# Patient Record
Sex: FEMALE | HISPANIC OR LATINO
[De-identification: names, ages, dates, MRNs, and addresses within clinical notes are randomized per-mention and may not be internally consistent; named-entity substitution may affect disease eponyms.]

---

## 2018-08-19 ENCOUNTER — RESULT REVIEW (OUTPATIENT)
Age: 48
End: 2018-08-19

## 2022-04-28 ENCOUNTER — APPOINTMENT (OUTPATIENT)
Dept: ORTHOPEDIC SURGERY | Facility: CLINIC | Age: 52
End: 2022-04-28
Payer: COMMERCIAL

## 2022-04-28 VITALS — BODY MASS INDEX: 18.48 KG/M2 | WEIGHT: 115 LBS | HEIGHT: 66 IN

## 2022-04-28 DIAGNOSIS — Z78.9 OTHER SPECIFIED HEALTH STATUS: ICD-10-CM

## 2022-04-28 DIAGNOSIS — M54.2 CERVICALGIA: ICD-10-CM

## 2022-04-28 PROBLEM — Z00.00 ENCOUNTER FOR PREVENTIVE HEALTH EXAMINATION: Status: ACTIVE | Noted: 2022-04-28

## 2022-04-28 PROCEDURE — 99204 OFFICE O/P NEW MOD 45 MIN: CPT

## 2022-04-28 PROCEDURE — 72050 X-RAY EXAM NECK SPINE 4/5VWS: CPT

## 2022-04-28 RX ORDER — NAPROXEN 500 MG/1
500 TABLET ORAL
Qty: 60 | Refills: 0 | Status: ACTIVE | COMMUNITY
Start: 2022-04-28 | End: 1900-01-01

## 2022-04-28 RX ORDER — CYCLOBENZAPRINE HYDROCHLORIDE 5 MG/1
5 TABLET, FILM COATED ORAL
Qty: 90 | Refills: 0 | Status: ACTIVE | COMMUNITY
Start: 2022-04-28 | End: 1900-01-01

## 2022-04-28 NOTE — ASSESSMENT
[FreeTextEntry1] : Initiate splints QHS \par PT, meds for neck\par Will discuss imaging\par \par Muscle Relaxants- To help decrease muscle spasm and assist with pain relief. Advised of sedating effects and instructed not to drive, operate heavy machinery, or take with other sedating medications.\par NSAIDs- Patient warned of risk of medication to GI tract, increased blood pressure, cardiac risk, and risk of fluid retention.  Advised to clear medication with internist or PCP if any concurrent health problem with heart, blood pressure, or GI system exists.
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
## 2022-04-28 NOTE — IMAGING
[de-identified] : Inspection: No rash or ecchymosis\par Palpation: No spasm in traps, rhomboids, paracervicals. TTP R paraspinal and trap\par ROM: Full with stiffness\par Strength: 5/5 bilateral deltoid, biceps, triceps, wrist flexors, wrist extensors, , abductors\par Sensation: Sensation present to light touch bilateral C5-T1 distributions\par Reflexes: Positive Conroy's left\par \par + b/l carpal compression testing R>L\par \par Bilateral Shoulders-\par Palpation: No tenderness to palpation\par ROM: Full with no pain\par Strength: Decent strength with rotator cuff testing\par Provocative maneuvers: Negative impingement testing   [Straightening consistent with spasm] : Straightening consistent with spasm [Facet arthropathy] : Facet arthropathy [Disc space narrowing] : Disc space narrowing [FreeTextEntry1] : DDD C4-7

## 2022-06-09 ENCOUNTER — APPOINTMENT (OUTPATIENT)
Dept: ORTHOPEDIC SURGERY | Facility: CLINIC | Age: 52
End: 2022-06-09
Payer: COMMERCIAL

## 2022-06-09 PROCEDURE — 99214 OFFICE O/P EST MOD 30 MIN: CPT

## 2022-06-09 NOTE — HISTORY OF PRESENT ILLNESS
[Neck] : neck [Gradual] : gradual [4] : 4 [2] : 2 [Dull/Aching] : dull/aching [Rest] : rest [Full time] : Work status: full time [de-identified] : 04/28/2022: 51RHDF. Several years of atraumatic neck pain localized to R parspinal and trap. No pain, N/T down the BUE. Does endorse b/l hand numbness that awakens her. No b/b dysfunction. \par \par 6/9/22: Neck pain improving with PT/HEP. Reports occasional numbness in fingers of b/l hands at night (has been wearing splints for CTS but they broke).  [] : no [de-identified] : movement

## 2022-06-09 NOTE — IMAGING
[de-identified] : CSPINE\par Inspection: No rash or ecchymosis\par Palpation: No spasm in traps, rhomboids, paracervicals. TTP R paraspinal and rhomboid\par ROM: Full with stiffness\par Strength: 5/5 bilateral deltoid, biceps, triceps, wrist flexors, wrist extensors, , abductors\par Sensation: Sensation present to light touch bilateral C5-T1 distributions\par Reflexes: Positive Conroy's left\par \par + b/l carpal compression testing R>L\par \par Bilateral Shoulders-\par Palpation: No tenderness to palpation\par ROM: Full with no pain\par Strength: Decent strength with rotator cuff testing\par Provocative maneuvers: Negative impingement testing

## 2022-06-09 NOTE — ASSESSMENT
[FreeTextEntry1] : c/w splints QHS \par PT, meds for neck\par Will discuss imaging\par \par Muscle Relaxants- To help decrease muscle spasm and assist with pain relief. Advised of sedating effects and instructed not to drive, operate heavy machinery, or take with other sedating medications.\par \par NSAIDs- Patient warned of risk of medication to GI tract, increased blood pressure, cardiac risk, and risk of fluid retention.  Advised to clear medication with internist or PCP if any concurrent health problem with heart, blood pressure, or GI system exists.

## 2022-07-28 ENCOUNTER — APPOINTMENT (OUTPATIENT)
Dept: ORTHOPEDIC SURGERY | Facility: CLINIC | Age: 52
End: 2022-07-28

## 2022-12-08 ENCOUNTER — APPOINTMENT (OUTPATIENT)
Dept: ORTHOPEDIC SURGERY | Facility: CLINIC | Age: 52
End: 2022-12-08

## 2022-12-08 DIAGNOSIS — G56.00 CARPAL TUNNEL SYNDROME, UNSPECIFIED UPPER LIMB: ICD-10-CM

## 2022-12-08 DIAGNOSIS — M62.838 OTHER MUSCLE SPASM: ICD-10-CM

## 2022-12-08 DIAGNOSIS — M47.812 SPONDYLOSIS W/OUT MYELOPATHY OR RADICULOPATHY, CERVICAL REGION: ICD-10-CM

## 2022-12-08 PROCEDURE — 99213 OFFICE O/P EST LOW 20 MIN: CPT

## 2022-12-08 NOTE — ASSESSMENT
[FreeTextEntry1] : c/w splints QHS \par PT, meds for neck\par Will discuss imaging\par \par Muscle Relaxants- To help decrease muscle spasm and assist with pain relief. Advised of sedating effects and instructed not to drive, operate heavy machinery, or take with other sedating medications.\par \par NSAIDs- Patient warned of risk of medication to GI tract, increased blood pressure, cardiac risk, and risk of fluid retention.  Advised to clear medication with internist or PCP if any concurrent health problem with heart, blood pressure, or GI system exists. \par \par \par Patient seen by Belinda Cole PA-C, under the supervision of Dr. Bonifacio Goff M.D.

## 2022-12-08 NOTE — HISTORY OF PRESENT ILLNESS
[7] : 7 [6] : 6 [Burning] : burning [Localized] : localized [Intermittent] : intermittent [Ice] : ice [de-identified] : 04/28/2022: 51RHDF. Several years of atraumatic neck pain localized to R parspinal and trap. No pain, N/T down the BUE. Does endorse b/l hand numbness that awakens her. No b/b dysfunction. \par \par 6/9/22: Neck pain improving with PT/HEP. Reports occasional numbness in fingers of b/l hands at night (has been wearing splints for CTS but they broke). \par \par 12/8/22- Has had increased neck pain in the last couple of months after working with a . Aggravated by prolonged sitting/looking at her phone. Has not done PT recently. Denies pain/N/T in BUEs.  [FreeTextEntry1] : Neck  [FreeTextEntry5] : Pt states Neck is not improving, pt feeling the her neck pain has worsen since last visit. Pt states physical therapy was helping. Pt states when moving neck is when her pain increases. Pt states when using her LT shoulder is mainly when her Neck stats to increase her pain.  [FreeTextEntry6] : soreness

## 2022-12-08 NOTE — IMAGING
[de-identified] : CSPINE\par Inspection: No rash or ecchymosis\par Palpation: No spasm in traps, rhomboids, paracervicals. TTP R paraspinal and rhomboid\par ROM: Full with stiffness\par Strength: 5/5 bilateral deltoid, biceps, triceps, wrist flexors, wrist extensors, , abductors\par Sensation: Sensation present to light touch bilateral C5-T1 distributions\par Reflexes: Positive Conroy's left\par \par + b/l carpal compression testing R>L\par \par Bilateral Shoulders-\par Palpation: No tenderness to palpation\par ROM: Full with no pain\par Strength: Decent strength with rotator cuff testing\par Provocative maneuvers: Negative impingement testing

## 2023-01-19 ENCOUNTER — APPOINTMENT (OUTPATIENT)
Dept: ORTHOPEDIC SURGERY | Facility: CLINIC | Age: 53
End: 2023-01-19

## 2024-10-22 ENCOUNTER — APPOINTMENT (OUTPATIENT)
Dept: ORTHOPEDIC SURGERY | Facility: CLINIC | Age: 54
End: 2024-10-22
Payer: COMMERCIAL

## 2024-10-22 VITALS — WEIGHT: 115 LBS | HEIGHT: 66 IN | BODY MASS INDEX: 18.48 KG/M2

## 2024-10-22 DIAGNOSIS — M62.838 OTHER MUSCLE SPASM: ICD-10-CM

## 2024-10-22 DIAGNOSIS — M54.12 RADICULOPATHY, CERVICAL REGION: ICD-10-CM

## 2024-10-22 DIAGNOSIS — M47.22 OTHER SPONDYLOSIS WITH RADICULOPATHY, CERVICAL REGION: ICD-10-CM

## 2024-10-22 DIAGNOSIS — M48.02 SPINAL STENOSIS, CERVICAL REGION: ICD-10-CM

## 2024-10-22 PROCEDURE — 72040 X-RAY EXAM NECK SPINE 2-3 VW: CPT

## 2024-10-22 PROCEDURE — 99214 OFFICE O/P EST MOD 30 MIN: CPT

## 2024-11-19 ENCOUNTER — APPOINTMENT (OUTPATIENT)
Dept: ORTHOPEDIC SURGERY | Facility: CLINIC | Age: 54
End: 2024-11-19

## 2024-11-19 VITALS — HEIGHT: 66 IN | WEIGHT: 115 LBS | BODY MASS INDEX: 18.48 KG/M2

## 2024-11-19 DIAGNOSIS — M54.2 CERVICALGIA: ICD-10-CM

## 2024-11-19 PROCEDURE — 99213 OFFICE O/P EST LOW 20 MIN: CPT
